# Patient Record
Sex: MALE | Race: WHITE
[De-identification: names, ages, dates, MRNs, and addresses within clinical notes are randomized per-mention and may not be internally consistent; named-entity substitution may affect disease eponyms.]

---

## 2018-05-10 ENCOUNTER — HOSPITAL ENCOUNTER (OUTPATIENT)
Dept: HOSPITAL 84 - D.OPS | Age: 78
Discharge: HOME | End: 2018-05-10
Attending: INTERNAL MEDICINE
Payer: MEDICARE

## 2018-05-10 VITALS
HEIGHT: 74 IN | HEIGHT: 74 IN | BODY MASS INDEX: 27.65 KG/M2 | WEIGHT: 215.45 LBS | BODY MASS INDEX: 27.65 KG/M2 | WEIGHT: 215.45 LBS

## 2018-05-10 DIAGNOSIS — Z01.810: ICD-10-CM

## 2018-05-10 DIAGNOSIS — Z53.9: ICD-10-CM

## 2018-05-10 DIAGNOSIS — Z01.811: ICD-10-CM

## 2018-05-10 DIAGNOSIS — Z01.812: ICD-10-CM

## 2018-05-10 DIAGNOSIS — K25.3: Primary | ICD-10-CM

## 2018-05-10 LAB
ANION GAP SERPL CALC-SCNC: 10.5 MMOL/L (ref 8–16)
BASOPHILS NFR BLD AUTO: 0.5 % (ref 0–2)
BUN SERPL-MCNC: 23 MG/DL (ref 7–18)
CALCIUM SERPL-MCNC: 9.8 MG/DL (ref 8.5–10.1)
CHLORIDE SERPL-SCNC: 102 MMOL/L (ref 98–107)
CO2 SERPL-SCNC: 31.2 MMOL/L (ref 21–32)
CREAT SERPL-MCNC: 1.6 MG/DL (ref 0.6–1.3)
EOSINOPHIL NFR BLD: 3.6 % (ref 0–7)
ERYTHROCYTE [DISTWIDTH] IN BLOOD BY AUTOMATED COUNT: 12.8 % (ref 11.5–14.5)
GLUCOSE SERPL-MCNC: 91 MG/DL (ref 74–106)
HCT VFR BLD CALC: 42.6 % (ref 42–54)
HGB BLD-MCNC: 14.9 G/DL (ref 13.5–17.5)
IMM GRANULOCYTES NFR BLD: 0.2 % (ref 0–5)
INR PPP: 1 (ref 0.85–1.17)
LYMPHOCYTES NFR BLD AUTO: 32.7 % (ref 15–50)
MCH RBC QN AUTO: 33.2 PG (ref 26–34)
MCHC RBC AUTO-ENTMCNC: 35 G/DL (ref 31–37)
MCV RBC: 94.9 FL (ref 80–100)
MONOCYTES NFR BLD: 12.5 % (ref 2–11)
NEUTROPHILS NFR BLD AUTO: 50.5 % (ref 40–80)
OSMOLALITY SERPL CALC.SUM OF ELEC: 282 MOSM/KG (ref 275–300)
PLATELET # BLD: 121 10X3/UL (ref 130–400)
PMV BLD AUTO: 11.2 FL (ref 7.4–10.4)
POTASSIUM SERPL-SCNC: 3.7 MMOL/L (ref 3.5–5.1)
PROTHROMBIN TIME: 12.8 SECONDS (ref 11.6–15)
RBC # BLD AUTO: 4.49 10X6/UL (ref 4.2–6.1)
SODIUM SERPL-SCNC: 140 MMOL/L (ref 136–145)
WBC # BLD AUTO: 5.8 10X3/UL (ref 4.8–10.8)

## 2018-12-17 ENCOUNTER — HOSPITAL ENCOUNTER (OUTPATIENT)
Dept: HOSPITAL 84 - D.MAMMO | Age: 78
Discharge: HOME | End: 2018-12-17
Attending: GENERAL PRACTICE
Payer: MEDICARE

## 2018-12-17 VITALS — BODY MASS INDEX: 27.6 KG/M2

## 2018-12-17 DIAGNOSIS — N63.10: Primary | ICD-10-CM

## 2020-01-13 ENCOUNTER — HOSPITAL ENCOUNTER (OUTPATIENT)
Dept: HOSPITAL 84 - D.HCCECHO | Age: 80
Discharge: HOME | End: 2020-01-13
Attending: INTERNAL MEDICINE
Payer: MEDICARE

## 2020-01-13 VITALS — BODY MASS INDEX: 27.6 KG/M2

## 2020-01-13 DIAGNOSIS — I48.91: Primary | ICD-10-CM

## 2020-01-29 ENCOUNTER — HOSPITAL ENCOUNTER (OUTPATIENT)
Dept: HOSPITAL 84 - D.CATH | Age: 80
Discharge: HOME | End: 2020-01-29
Attending: INTERNAL MEDICINE
Payer: MEDICARE

## 2020-01-29 VITALS — BODY MASS INDEX: 27.5 KG/M2 | HEIGHT: 74 IN | BODY MASS INDEX: 27.5 KG/M2 | WEIGHT: 214.25 LBS

## 2020-01-29 VITALS — DIASTOLIC BLOOD PRESSURE: 91 MMHG | SYSTOLIC BLOOD PRESSURE: 141 MMHG

## 2020-01-29 DIAGNOSIS — E78.5: ICD-10-CM

## 2020-01-29 DIAGNOSIS — R06.09: ICD-10-CM

## 2020-01-29 DIAGNOSIS — I10: ICD-10-CM

## 2020-01-29 DIAGNOSIS — I48.91: Primary | ICD-10-CM

## 2020-01-29 LAB
ANION GAP SERPL CALC-SCNC: 9.4 MMOL/L (ref 8–16)
BASOPHILS NFR BLD AUTO: 0.4 % (ref 0–2)
BUN SERPL-MCNC: 21 MG/DL (ref 7–18)
CALCIUM SERPL-MCNC: 8.6 MG/DL (ref 8.5–10.1)
CHLORIDE SERPL-SCNC: 106 MMOL/L (ref 98–107)
CO2 SERPL-SCNC: 28.9 MMOL/L (ref 21–32)
CREAT SERPL-MCNC: 1.5 MG/DL (ref 0.6–1.3)
EOSINOPHIL NFR BLD: 4.9 % (ref 0–7)
ERYTHROCYTE [DISTWIDTH] IN BLOOD BY AUTOMATED COUNT: 12.8 % (ref 11.5–14.5)
GLUCOSE SERPL-MCNC: 90 MG/DL (ref 74–106)
HCT VFR BLD CALC: 43.6 % (ref 42–54)
HGB BLD-MCNC: 15.1 G/DL (ref 13.5–17.5)
IMM GRANULOCYTES NFR BLD: 0.3 % (ref 0–5)
INR PPP: 1.34 (ref 0.85–1.17)
LYMPHOCYTES NFR BLD AUTO: 20 % (ref 15–50)
MCH RBC QN AUTO: 33.1 PG (ref 26–34)
MCHC RBC AUTO-ENTMCNC: 34.6 G/DL (ref 31–37)
MCV RBC: 95.6 FL (ref 80–100)
MONOCYTES NFR BLD: 10.4 % (ref 2–11)
NEUTROPHILS NFR BLD AUTO: 64 % (ref 40–80)
OSMOLALITY SERPL CALC.SUM OF ELEC: 281 MOSM/KG (ref 275–300)
PLATELET # BLD: 227 10X3/UL (ref 130–400)
PMV BLD AUTO: 10.6 FL (ref 7.4–10.4)
POTASSIUM SERPL-SCNC: 4.3 MMOL/L (ref 3.5–5.1)
PROTHROMBIN TIME: 16.5 SECONDS (ref 11.6–15)
RBC # BLD AUTO: 4.56 10X6/UL (ref 4.2–6.1)
SODIUM SERPL-SCNC: 140 MMOL/L (ref 136–145)
WBC # BLD AUTO: 7.7 10X3/UL (ref 4.8–10.8)

## 2020-01-29 NOTE — NUR
PT RESTING COMFORTABLY, DENIES PAIN OR DISCOMFORT.  ICE WATER GIVEN,
REFUSES SANDWICH WILL EAT AFTER D/C.  HR NSR 51, /72. RESP EVEN
AND UNLABORED.  DR CRISTINA WAS IN AND SPOKE WITH PT AND WIFE REGARDING
PROCEDURE RESULTS AND PLAN OF CARE.  CALL LIGHT IN REACH

## 2020-01-29 NOTE — NUR
PT RECEIVED VIA STRETCHER FROM SUCCESSFUL CARDIOVERSION.  PT SLEEPING
BUT VERBALLY AROUSABLE.   IV PATENT INFUSING VIA ORDERS.  PT PLACED
ON CARDIAC MONITOR AND O2 VIA NC AT 2L/NC.  SMALL RED AREA ON UPPER
CHEST NOTED.  HR SB RATE 55, /69, RR 16.  CALL LIGHT IN REACH,
WIFE AT BEDSIDE.

## 2020-01-29 NOTE — HEMODYNAMI
PATIENT:RAMIN DE LEON                                 MEDICAL RECORD: [id number]
: 42                                            LOCATION:[facility]          
ACCT# [id number]                                       ADMISSION DATE: 20
 
 
 Generatedon:202012:[id number]
Patient name: RAMIN DE LEON Patient #: [id number] Visit #: [id number] SSN: YOB: 1940 Date of study: 2020
Page: Of
Hemodynamic Procedure Report
****************************
Patient Data
Patient Demographics
Procedure consent was obtained
First Name: RAMIN           Gender: Male
Last Name: HALEY         : 1940
Middle Initial: F           Age: 79 year(s)
Patient #: [id number]       Race: Unknown
Visit #: [id number]
Accession #:
[id number]
Additional ID: [id number]
Contact details
Address: [address] [place] [address]   Phone: [phone]
State: [place]
City: [place]
Zip code: [address]
Past Medical History
Allergies: No known allergies
Admission
Admission Data
Admission Date: 2020   Admission Time: 10:01
Lab Results
Lab Result Date: 2020  Lab Result Time: 0:00
Biochemistry
Name         Units    Result                Min      Max
BUN          mg/dl    21       --(----)-*   7        18
Creatinine   mg/dl    1.5      --(----)-*   0.6      1.3
eGFR         ml/min   48       *-(----)--   90       120
NONAFRICAN
CBC
Name         Units    Result                Min      Max
Hematocrit   %        43.6     --(*---)--   42       54
Hemoglobin   g/dl     15.1     --(-*--)--   13.5     17.5
Procedure
Procedure Types
Cath Procedure
Diagnostic Procedure
Cardioversion External
Procedure Description
Procedure Date
Procedure Date: 2020
Procedure Start Time: 12:48
Procedure End Time: 12:54
Procedure Staff
Name                            Function
Ollie Mondragon MD                Performing Physician
Petty De La Cruz RT               Monitor
 
Jessenia Roa RT                Monitor
Jeffry Oden RN                  Nurse
Margi Cadena RN                Nurse
ROMULO Ewing CRNA               Additional personnel
Procedure Data
Cath Procedure
Fluoroscopy
Diagnostic fluoroscopy      Total fluoroscopy Time: 0
time: 0 min                 min
Diagnostic fluoroscopy      Total fluoroscopy dose: 0
dose: 0 mGy                 mGy
Estimated blood loss: 0 ml
Procedure Complications
No complications
Procedure Medications
Medication           Administration Route Dosage
Oxygen               etCO2 Nasal cannula  2 l/min
Refer to Anesthesia
Notes for Sedation
Medications
Hemodynamics
Rest
Heart Rate: 79 (bpm)
Snapshots
Pre Cath      Intra         NCS           Post Cath
Vital Signs
Time     Heart  Resp   SPO2 etCO2   NIBP (mmHg)  Rhythm  Pain    Sedation
Rate   (ipm)  (%)  (mmHg)                       Status  Level
(bpm)
12:47:51 70     18     98   33.7    173/104(135) A-Fib   0 (11)  10(A)
, No
pain
12:52:15 52     15     93   18.7    133/72(93)   NSR     0 (11)  9(A)
, No
pain
12:55:38 55     13     94   22.4    125/75(100)  NSR     0 (11)  10(A)
, No
pain
Medications
Time     Medication  Route   Dose  Verified Delivered Reason   Notes  Effectiven
ess
by       by
12:41:33 Oxygen      etCO2   2     Margi    Margi     Per
Nasal   l/min Surinder Cadena   protocol
cannula       RN       RN
12:43:02 Refer to                  Margihunter Kiser
Anesthesia                Surinder Cadena
Notes for                 RN       RN
Sedation
Medications
Procedure Log
Time     Note
12:32:04 Informed consent obtained and on chart
12:32:45 Procedure Status Cardioversion.
12:32:47 Margi Cadena RN sent for patient. Start room use.
12:32:48 Time tracking: Regular hours (M-F 7:00 - 5:00)
12:32:52 Plan of Care:Hemodynamics will remain stable., Cardiac
rhythm will remain stable., Comfort level will be
maintained., Respiratory function will remain
adequate., Patient/ family verbilizes understanding of
 
procedure., Procedure tolerated without complication.,
Recovers from procedure without complications..
12:33:05 H&P Date Dictated: 2020 Within 30 days and on
chart., H&P Addendum completed by physician on day of
procedure. (MUST COMPLETE FOR ALL OUTPATIENTS).
12:33:11 Patient allergic to No known allergies
12:37:58 Lab Result : BUN 21 mg/dl
12:37:58 Lab Result : Creatinine 1.5 mg/dl
12:37:58 Lab Result : eGFR NONAFRICAN 48 ml/min
12:37:58 Lab Result : Hemoglobin 15.1 g/dl
12:37:58 Lab Result : Hematocrit 43.6 %
12:39:21 Patient arrived from Pre/Post Procedure Room to CCL 1.
Patient remains on bed/stretcher for procedure.
12:39:23 Warm blankets applied, and calderon hugger turned on for
patient comfort.
12:39:23 Correct patient and procedure confirmed by team.
12:39:24 ECG and BP/O2 sat monitors applied to patient.
12:40:21 Pre-procedure instructions explained to patient.
12:40:21 Pre-op teaching completed and patient verbalized
understanding.
12:40:23 Family in patients room.
12:40:24 Patient NPO since Midnight.
12:40:26 Is the patient allergic to Iodine/contrast media? No.
12:40:26 Is patient on blood thinner?Yes
12:40:30 **ACC** The patient was administered the following
blood thiners within the last 24 hours: Xarelto
12:40:32 Patient diabetic? No.
12:40:36 Previous problem with sedation/anesthesia? No ?
12:40:38 Snore? Yes
12:40:40 Sleep apnea? Yes
12:40:41 Deviated septum? No
12:40:42 Opens mouth fully? Yes
12:40:42 Sticks out tongue? Yes
12:40:45 Airway obstruction? No ?
12:40:47 Dentures? No ?
12:41:13 Patient pain scale 0/10 ?.
12:41:23 IV patent on arrival in left forearm with 0.9% NaCl at
[facility].
12:41:25 Lab results completed and on chart.
12:41:28 Alarms reviewed by NITHYA CAN
12:41:33 Oxygen 2 l/min etCO2 Nasal cannula was administered by
Margi Cadena RN; Per protocol; Verbal order read back
and verified.
12:42:10 Quick Combo opened to sterile field.
12:43:02 Refer to Anesthesia Notes for Sedation Medications was
administered by Margi Cadena RN; ; Verbal order read
back and verified.
12:44:11 ROMULO Ewing CRNA present and monitoring patient for
TIVA.
12:46:32 Vital chart was started
12:46:35 Baseline sample Acquired.
12:46:42 Rhythm: atrial fibrillation
12:46:44 Full Disclosure recording started
12:47:01 --------ALL STOP TIME OUT------
12:47:01 Final Timeout: patient, procedure, and site verified
with staff and physician. All members of the team are
in agreement.
12:47:08 Physical assessment completed. ASA score P 2 - A
patient with mild systemic disease as per Ollie Mondragon MD.
 
12:47:12 Sedation plan: TIVA Medication:Propofol
12:48:09 Procedure started.
12:48:11 ------Cardioversion------
12:48:15 Quick combo pads placed on patients chest and back.
12:49:33 Defibrillator synced and charged to 275 Joules.
12:49:41 Shock delivered.
12:50:01 Patient cardioverted to sinus bradycardia.
12:50:24 Procedure ended.(Physican Out)
12:51:34 Fluoroscopy time 00.00 minutes.
12:51:37 Fluoroscopy dose: 0 mGy
12:51:37 Flurop Dose total: 0
12:52:06 Post-procedure physical assessment completed. ASA
score P 2 - A patient with mild systemic disease as
per Ollie Mondragon MD.
12:52:08 Post procedure rhythm: sinus bradycardia
12:52:11 Estimated blood loss: 0 ml
12:52:12 Post procedure instruction explained to
patient.Patient verbalizes understanding.
12:52:12 Patient needs reinforcement of post procedure
teaching.
12:52:39 Procedure and supply charges have been captured,
reviewed, submitted and are correct.
12:52:42 Procedure Complication : No complications
12:52:49 Operative report dictated upon procedure completion.
12:52:49 See physician's report for complete and final results.
12:52:53 Report given to Pre/Post Procedure Room.
12:52:57 Patient transfered to Pre/Post Procedure Room with
Bed.
12:54:31 Vital chart was stopped
12:54:33 Procedure ended.
12:54:33 Full Disclosure recording stopped
12:54:39 End room use (Document Last)
12:55:00 End room use (Document Last)
12:55:34 End room use (Document Last)
Device Usage
Item  Manufacture  Quantity  Catalog       Hospital Part    Current Minimal Lot#
 /
Name                         Number        Charge   Number  Stock   Stock   Frank
al#
Code
[employer] 1         [id number]  [id number]   [id number]  [id number]  5
Combo
Signature Audit [place]
Stage           Time        Signature      Unsigned
Intra-Procedure 2020   Petty De La Cruz
12:55:00 PM RT(R)
Intra-Procedure 2020   Jeffry Oden RN
12:55:35 PM
Intra-Procedure 2020   Ollie Mondragon
12:56:13 PM MD
 
 
 
 
 
[address] [address] [place] [address]

## 2020-01-29 NOTE — NUR
DISCHARGE INSTRUCTIONS REVIEWED W PT AND WIFE, BOTH VERBALIZED
UNDERSTANDING.  IV REMOVED W CATH INTACT, MONITORS AND O2 REMOVED AND
PT UP TO DRESS FOR DISCHARGE.  HR REMAINS NSR.

## 2020-01-30 NOTE — OP
PATIENT NAME:  RAMIN DE LEON                          MEDICAL RECORD: [id number]
:42                                             LOCATION:[facility]          
                                                         ADMISSION DATE:        
SURGEON:  JESSICA CRISTINA MD             
 
 
DATE OF OPERATION:  2020
 
PROCEDURE:  DC cardioversion.
 
INDICATION:  Atrial fibrillation.
 
PROCEDURE IN DETAIL:  IV conscious sedation was per anesthesia.  He received 1
shock at 275 joules restoring sinus rhythm.
 
OVERALL IMPRESSION:  Successful DC cardioversion from atrial fibrillation to
sinus rhythm.
 
TRANSINT:[id number] Voice Confirmation ID: [id number] DOCUMENT ID: [id number]
                                           
                                           JESSICA CRISTINA MD             
 
 
 
Electronically Signed by JESSICA CRISTINA on 20 at 1254
 
 
 
 
 
 
 
 
 
 
 
 
 
 
 
 
 
 
 
 
 
 
 
 
 
CC:                                                             [id number]
DICTATION DATE: 20 1251     :     20 1853      [facility] CLI 
                                                                      20
[id number] [place] [address]

## 2020-10-20 ENCOUNTER — HOSPITAL ENCOUNTER (OUTPATIENT)
Dept: HOSPITAL 84 - D.RAD | Age: 80
Discharge: HOME | End: 2020-10-20
Attending: NURSE PRACTITIONER
Payer: MEDICARE

## 2020-10-20 VITALS — BODY MASS INDEX: 27.5 KG/M2

## 2020-10-20 DIAGNOSIS — R06.01: Primary | ICD-10-CM

## 2021-06-25 ENCOUNTER — HOSPITAL ENCOUNTER (OUTPATIENT)
Dept: HOSPITAL 84 - D.US | Age: 81
Discharge: HOME | End: 2021-06-25
Attending: NURSE PRACTITIONER
Payer: MEDICARE

## 2021-06-25 VITALS — BODY MASS INDEX: 27.6 KG/M2

## 2021-06-25 DIAGNOSIS — R10.9: Primary | ICD-10-CM
